# Patient Record
Sex: FEMALE | Race: BLACK OR AFRICAN AMERICAN | NOT HISPANIC OR LATINO | URBAN - METROPOLITAN AREA
[De-identification: names, ages, dates, MRNs, and addresses within clinical notes are randomized per-mention and may not be internally consistent; named-entity substitution may affect disease eponyms.]

---

## 2017-03-22 ENCOUNTER — EMERGENCY (EMERGENCY)
Facility: HOSPITAL | Age: 27
LOS: 1 days | Discharge: HOME | End: 2017-03-22
Attending: EMERGENCY MEDICINE | Admitting: EMERGENCY MEDICINE
Payer: COMMERCIAL

## 2017-03-22 VITALS
DIASTOLIC BLOOD PRESSURE: 56 MMHG | HEART RATE: 65 BPM | SYSTOLIC BLOOD PRESSURE: 101 MMHG | RESPIRATION RATE: 18 BRPM | OXYGEN SATURATION: 98 % | TEMPERATURE: 98 F | WEIGHT: 134.92 LBS
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
## 2017-03-22 DIAGNOSIS — Z88.8 ALLERGY STATUS TO OTHER DRUGS, MEDICAMENTS AND BIOLOGICAL SUBSTANCES STATUS: ICD-10-CM
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
## 2017-03-22 DIAGNOSIS — S80.851A SUPERFICIAL FOREIGN BODY, RIGHT LOWER LEG, INITIAL ENCOUNTER: ICD-10-CM
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
## 2017-03-22 PROCEDURE — 73502 X-RAY EXAM HIP UNI 2-3 VIEWS: CPT | Mod: 26,RT

## 2017-03-22 PROCEDURE — 99284 EMERGENCY DEPT VISIT MOD MDM: CPT | Mod: 25
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
## 2017-03-23 VITALS
DIASTOLIC BLOOD PRESSURE: 65 MMHG | OXYGEN SATURATION: 97 % | HEART RATE: 56 BPM | RESPIRATION RATE: 18 BRPM | SYSTOLIC BLOOD PRESSURE: 103 MMHG | TEMPERATURE: 98 F

## 2017-03-23 PROCEDURE — 99283 EMERGENCY DEPT VISIT LOW MDM: CPT | Mod: 25

## 2017-03-23 PROCEDURE — 73502 X-RAY EXAM HIP UNI 2-3 VIEWS: CPT | Mod: 26,RT

## 2017-03-23 PROCEDURE — 73502 X-RAY EXAM HIP UNI 2-3 VIEWS: CPT

## 2017-03-23 RX ORDER — OXYCODONE HYDROCHLORIDE 5 MG/1
1 TABLET ORAL
Qty: 9 | Refills: 0 | OUTPATIENT
Start: 2017-03-23 | End: 2017-03-26

## 2017-03-23 RX ORDER — ACETAMINOPHEN 500 MG
975 TABLET ORAL ONCE
Qty: 0 | Refills: 0 | Status: DISCONTINUED | OUTPATIENT
Start: 2017-03-23 | End: 2017-03-23

## 2017-03-23 RX ORDER — OXYCODONE HYDROCHLORIDE 5 MG/1
15 TABLET ORAL ONCE
Qty: 0 | Refills: 0 | Status: DISCONTINUED | OUTPATIENT
Start: 2017-03-23 | End: 2017-03-23
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
## 2017-03-28 ENCOUNTER — EMERGENCY (EMERGENCY)
Facility: HOSPITAL | Age: 27
LOS: 1 days | Discharge: HOME | End: 2017-03-28
Attending: EMERGENCY MEDICINE | Admitting: EMERGENCY MEDICINE
Payer: COMMERCIAL

## 2017-03-28 ENCOUNTER — APPOINTMENT (OUTPATIENT)
Dept: SURGERY | Facility: CLINIC | Age: 27
End: 2017-03-28

## 2017-03-28 VITALS
DIASTOLIC BLOOD PRESSURE: 70 MMHG | OXYGEN SATURATION: 100 % | WEIGHT: 139.99 LBS | SYSTOLIC BLOOD PRESSURE: 106 MMHG | TEMPERATURE: 98 F | RESPIRATION RATE: 18 BRPM | HEART RATE: 60 BPM

## 2017-03-28 VITALS
OXYGEN SATURATION: 99 % | HEIGHT: 62.5 IN | SYSTOLIC BLOOD PRESSURE: 120 MMHG | HEART RATE: 67 BPM | WEIGHT: 146 LBS | BODY MASS INDEX: 26.19 KG/M2 | DIASTOLIC BLOOD PRESSURE: 74 MMHG | TEMPERATURE: 97.8 F

## 2017-03-28 VITALS
SYSTOLIC BLOOD PRESSURE: 107 MMHG | HEART RATE: 63 BPM | RESPIRATION RATE: 17 BRPM | OXYGEN SATURATION: 100 % | DIASTOLIC BLOOD PRESSURE: 66 MMHG

## 2017-03-28 DIAGNOSIS — S71.101D UNSPECIFIED OPEN WOUND, RIGHT THIGH, SUBSEQUENT ENCOUNTER: ICD-10-CM

## 2017-03-28 DIAGNOSIS — S71.109S: ICD-10-CM
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
## 2017-03-28 DIAGNOSIS — R11.0 NAUSEA: ICD-10-CM
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
## 2017-03-28 DIAGNOSIS — S31.819D UNSPECIFIED OPEN WOUND OF RIGHT BUTTOCK, SUBSEQUENT ENCOUNTER: ICD-10-CM
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
## 2017-03-28 DIAGNOSIS — Z88.8 ALLERGY STATUS TO OTHER DRUGS, MEDICAMENTS AND BIOLOGICAL SUBSTANCES STATUS: ICD-10-CM

## 2017-03-28 DIAGNOSIS — Z79.899 OTHER LONG TERM (CURRENT) DRUG THERAPY: ICD-10-CM

## 2017-03-28 DIAGNOSIS — Z23 ENCOUNTER FOR IMMUNIZATION: ICD-10-CM
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
## 2017-03-28 PROCEDURE — 90471 IMMUNIZATION ADMIN: CPT

## 2017-03-28 PROCEDURE — 99284 EMERGENCY DEPT VISIT MOD MDM: CPT

## 2017-03-28 PROCEDURE — 99283 EMERGENCY DEPT VISIT LOW MDM: CPT | Mod: 25

## 2017-03-28 PROCEDURE — 90715 TDAP VACCINE 7 YRS/> IM: CPT

## 2017-03-28 PROCEDURE — 73502 X-RAY EXAM HIP UNI 2-3 VIEWS: CPT | Mod: 26,RT

## 2017-03-28 PROCEDURE — 73502 X-RAY EXAM HIP UNI 2-3 VIEWS: CPT

## 2017-03-28 RX ORDER — TETANUS TOXOID, REDUCED DIPHTHERIA TOXOID AND ACELLULAR PERTUSSIS VACCINE, ADSORBED 5; 2.5; 8; 8; 2.5 [IU]/.5ML; [IU]/.5ML; UG/.5ML; UG/.5ML; UG/.5ML
0.5 SUSPENSION INTRAMUSCULAR ONCE
Qty: 0 | Refills: 0 | Status: COMPLETED | OUTPATIENT
Start: 2017-03-28 | End: 2017-03-28

## 2017-03-28 RX ORDER — FAMOTIDINE 10 MG/ML
1 INJECTION INTRAVENOUS
Qty: 15 | Refills: 0 | OUTPATIENT
Start: 2017-03-28 | End: 2017-04-12

## 2017-03-28 RX ORDER — ONDANSETRON 8 MG/1
4 TABLET, FILM COATED ORAL ONCE
Qty: 0 | Refills: 0 | Status: COMPLETED | OUTPATIENT
Start: 2017-03-28 | End: 2017-03-28

## 2017-03-28 RX ORDER — ONDANSETRON 8 MG/1
1 TABLET, FILM COATED ORAL
Qty: 20 | Refills: 0 | OUTPATIENT
Start: 2017-03-28

## 2017-03-28 RX ORDER — ACETAMINOPHEN 500 MG
650 TABLET ORAL ONCE
Qty: 0 | Refills: 0 | Status: COMPLETED | OUTPATIENT
Start: 2017-03-28 | End: 2017-03-28

## 2017-03-28 RX ADMIN — TETANUS TOXOID, REDUCED DIPHTHERIA TOXOID AND ACELLULAR PERTUSSIS VACCINE, ADSORBED 0.5 MILLILITER(S): 5; 2.5; 8; 8; 2.5 SUSPENSION INTRAMUSCULAR at 16:55

## 2017-03-28 RX ADMIN — Medication 650 MILLIGRAM(S): at 16:55

## 2017-03-28 RX ADMIN — ONDANSETRON 4 MILLIGRAM(S): 8 TABLET, FILM COATED ORAL at 17:13
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
## 2017-03-28 NOTE — ED PROVIDER NOTE - PROGRESS NOTE DETAILS
Xray w cont fb, slightly less. Discussed wound care, retained fb, signs/sx of infection w pt and sig other. Pt c/o n, states not sexually active since before birth of son, hcg neg at last visit.  Will dc rec tylenol, trial of h2 blocker and nausea meds.  Rec f/u pmd.
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
## 2017-04-14 NOTE — ED ADULT TRIAGE NOTE - HEART RATE (BEATS/MIN)
65
Alert-The patient is alert, awake and responds to voice. The patient is oriented to time, place, and person. The triage nurse is able to obtain subjective information.

## 2017-12-16 ENCOUNTER — EMERGENCY (EMERGENCY)
Facility: HOSPITAL | Age: 27
LOS: 1 days | Discharge: HOME | End: 2017-12-16
Admitting: EMERGENCY MEDICINE
Payer: COMMERCIAL

## 2017-12-16 VITALS
DIASTOLIC BLOOD PRESSURE: 57 MMHG | RESPIRATION RATE: 18 BRPM | HEART RATE: 73 BPM | TEMPERATURE: 98 F | WEIGHT: 125 LBS | OXYGEN SATURATION: 100 % | SYSTOLIC BLOOD PRESSURE: 100 MMHG

## 2017-12-16 PROCEDURE — 73660 X-RAY EXAM OF TOE(S): CPT | Mod: 26,RT

## 2017-12-16 PROCEDURE — 99283 EMERGENCY DEPT VISIT LOW MDM: CPT | Mod: 25

## 2017-12-16 PROCEDURE — 73660 X-RAY EXAM OF TOE(S): CPT

## 2017-12-16 RX ORDER — ACETAMINOPHEN 500 MG
650 TABLET ORAL ONCE
Qty: 0 | Refills: 0 | Status: COMPLETED | OUTPATIENT
Start: 2017-12-16 | End: 2017-12-16

## 2017-12-16 RX ADMIN — Medication 650 MILLIGRAM(S): at 02:12

## 2017-12-16 NOTE — ED PROVIDER NOTE - OBJECTIVE STATEMENT
28 yo female in the Er with chronic pain to her right toe for a few month. pt mentioned that she had an infection there in the past and not sure if its healed properly. Denies discolorations, numbness or swelling to her toe

## 2017-12-16 NOTE — ED PROVIDER NOTE - MEDICAL DECISION MAKING DETAILS
28 yo female with chronic pain to her right toe. No evidence of fx, no infection on exam, sensations intact. unclear etiology of pain. plan : comfortable shoe, pain control podiatry f/u

## 2017-12-16 NOTE — ED PROVIDER NOTE - MUSCULOSKELETAL, MLM
Spine appears normal, range of motion is not limited,  right toe- no discolorations, nail intact, normal sensations and no deformity, no wounds

## 2017-12-20 DIAGNOSIS — G89.29 OTHER CHRONIC PAIN: ICD-10-CM

## 2017-12-20 DIAGNOSIS — Z88.8 ALLERGY STATUS TO OTHER DRUGS, MEDICAMENTS AND BIOLOGICAL SUBSTANCES STATUS: ICD-10-CM

## 2017-12-20 DIAGNOSIS — M79.674 PAIN IN RIGHT TOE(S): ICD-10-CM

## 2017-12-26 ENCOUNTER — EMERGENCY (EMERGENCY)
Facility: HOSPITAL | Age: 27
LOS: 1 days | Discharge: HOME | End: 2017-12-26
Attending: EMERGENCY MEDICINE | Admitting: EMERGENCY MEDICINE
Payer: COMMERCIAL

## 2017-12-26 VITALS
RESPIRATION RATE: 18 BRPM | WEIGHT: 125 LBS | TEMPERATURE: 99 F | SYSTOLIC BLOOD PRESSURE: 110 MMHG | HEIGHT: 62 IN | HEART RATE: 76 BPM | OXYGEN SATURATION: 100 % | DIASTOLIC BLOOD PRESSURE: 76 MMHG

## 2017-12-26 DIAGNOSIS — R11.2 NAUSEA WITH VOMITING, UNSPECIFIED: ICD-10-CM

## 2017-12-26 DIAGNOSIS — R19.7 DIARRHEA, UNSPECIFIED: ICD-10-CM

## 2017-12-26 DIAGNOSIS — Z88.8 ALLERGY STATUS TO OTHER DRUGS, MEDICAMENTS AND BIOLOGICAL SUBSTANCES STATUS: ICD-10-CM

## 2017-12-26 PROCEDURE — 99284 EMERGENCY DEPT VISIT MOD MDM: CPT

## 2017-12-26 PROCEDURE — 99283 EMERGENCY DEPT VISIT LOW MDM: CPT

## 2017-12-26 RX ORDER — ACETAMINOPHEN 500 MG
650 TABLET ORAL ONCE
Qty: 0 | Refills: 0 | Status: COMPLETED | OUTPATIENT
Start: 2017-12-26 | End: 2017-12-26

## 2017-12-26 RX ADMIN — Medication 650 MILLIGRAM(S): at 15:56

## 2017-12-26 NOTE — ED PROVIDER NOTE - MEDICAL DECISION MAKING DETAILS
26 y/o AA female w/ above PMH presents w/ n/v/d likely 2/2 gastroenteritis. Will tx symptoms w/ tylenol and zofran and d/c. 26 y/o AA female w/ above PMH presents w/ n/v/d likely 2/2 gastroenteritis. Will tx symptoms w/ tylenol and zofran (pt refusing) and d/c. Urine pregnancy negative.
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
## 2017-12-26 NOTE — ED ADULT NURSE NOTE - OBJECTIVE STATEMENT
27 year old female presents to the ED complaining of cramping lower abdominal pain that began 2 days ago associated with nausea, and several episodes of vomiting and diarrhea. Denies fever, chills, hematuria, dysuria, back pain, headache, and any other associated symptoms. Denies sick contacts at home.

## 2017-12-26 NOTE — ED PROVIDER NOTE - ATTENDING CONTRIBUTION TO CARE
pt seen and examined with resident- 26 yo female with N/V/D  x 1 day  no fevers or chills no yvette abd pain- no melena or hematochezia  AFVSS  op moist  Chest BS= CV RRR  Abd  soft NT ND- well appearing benign abdomen  may dc home  Zofran prn  Pedialyte and fluids

## 2018-05-26 ENCOUNTER — EMERGENCY (EMERGENCY)
Facility: HOSPITAL | Age: 28
LOS: 1 days | Discharge: HOME | End: 2018-05-26
Attending: EMERGENCY MEDICINE | Admitting: EMERGENCY MEDICINE
Payer: COMMERCIAL

## 2018-05-26 VITALS
HEIGHT: 62 IN | TEMPERATURE: 98 F | OXYGEN SATURATION: 100 % | HEART RATE: 67 BPM | WEIGHT: 113.1 LBS | SYSTOLIC BLOOD PRESSURE: 117 MMHG | RESPIRATION RATE: 20 BRPM | DIASTOLIC BLOOD PRESSURE: 79 MMHG

## 2018-05-26 DIAGNOSIS — R53.81 OTHER MALAISE: ICD-10-CM

## 2018-05-26 DIAGNOSIS — R51 HEADACHE: ICD-10-CM

## 2018-05-26 DIAGNOSIS — R09.81 NASAL CONGESTION: ICD-10-CM

## 2018-05-26 DIAGNOSIS — Z88.8 ALLERGY STATUS TO OTHER DRUGS, MEDICAMENTS AND BIOLOGICAL SUBSTANCES: ICD-10-CM

## 2018-05-26 DIAGNOSIS — R05 COUGH: ICD-10-CM

## 2018-05-26 PROCEDURE — 99283 EMERGENCY DEPT VISIT LOW MDM: CPT | Mod: 25

## 2018-05-26 PROCEDURE — 99283 EMERGENCY DEPT VISIT LOW MDM: CPT

## 2018-05-26 PROCEDURE — 71046 X-RAY EXAM CHEST 2 VIEWS: CPT | Mod: 26

## 2018-05-26 PROCEDURE — 71046 X-RAY EXAM CHEST 2 VIEWS: CPT

## 2018-05-26 RX ORDER — PSEUDOEPHEDRINE HCL 30 MG
30 TABLET ORAL ONCE
Qty: 0 | Refills: 0 | Status: COMPLETED | OUTPATIENT
Start: 2018-05-26 | End: 2018-05-26

## 2018-05-26 RX ORDER — ACETAMINOPHEN 500 MG
650 TABLET ORAL ONCE
Qty: 0 | Refills: 0 | Status: COMPLETED | OUTPATIENT
Start: 2018-05-26 | End: 2018-05-26

## 2018-05-26 RX ADMIN — Medication 650 MILLIGRAM(S): at 12:42

## 2018-05-26 RX ADMIN — Medication 30 MILLIGRAM(S): at 13:14

## 2018-05-26 NOTE — ED PROVIDER NOTE - MEDICAL DECISION MAKING DETAILS
28 yo female with hx of rickets, IBS, otherwise healthy, p/w body aches, cough productive of brown sputum, congestion, diffuse HA and malaise X 3 days. (+) congestion/ sore throat. Took Tylenol pm 2 days ago for these sxs. Had the flu shot this past season. No V/D/ abd pain/ fevers. Pt non-toxic appearing with non-focal exam. Pt given Tylenol in ED. CXR with NAD. Findings discussed with pt and supportive care recommended. Pt requesting decongestant. Sudafed given. To f/up outpt.

## 2018-05-26 NOTE — ED ADULT NURSE NOTE - OBJECTIVE STATEMENT
c/o body , aches , chills, headache protective cough with brown mucus for 3 days , no sick contact , child is healthy LMP 1 week ago, Tylenol taken 2 days ago

## 2018-05-26 NOTE — ED PROVIDER NOTE - OBJECTIVE STATEMENT
26 yo female with hx of 26 yo female with hx of rickets, IBS, otherwise healthy, p/w body aches, cough productive of brown sputum, diffuse HA and malaise X 3 days. (+) congestion/ sore throat. Took Tylenol pm 2 days ago for these sxs. Had the flu shot this past season. No V/D/ abd pain/ fevers. Pt is requesting IV pain meds. Denies any other complaints. 28 yo female with hx of rickets, IBS, otherwise healthy, p/w body aches, cough productive of brown sputum, congestion, diffuse HA and malaise X 3 days. (+) congestion/ sore throat. Took Tylenol pm 2 days ago for these sxs. Had the flu shot this past season. No V/D/ abd pain/ fevers. Pt is requesting IV pain meds. Denies any other complaints.

## 2018-05-26 NOTE — ED PROVIDER NOTE - PROGRESS NOTE DETAILS
Pt given Tylenol. CXR with NAD. Findings discussed with pt and supportive care recommended. Pt requesting decongestant. Sudafed given.

## 2018-05-26 NOTE — ED PROVIDER NOTE - ENMT, MLM
Airway patent, Nasal mucosa clear. Mouth with normal mucosa. Throat has no vesicles, no oropharyngeal exudates and uvula is midline. TMs clear b/l.

## 2018-12-02 ENCOUNTER — EMERGENCY (EMERGENCY)
Facility: HOSPITAL | Age: 28
LOS: 1 days | Discharge: HOME | End: 2018-12-02
Attending: EMERGENCY MEDICINE | Admitting: EMERGENCY MEDICINE
Payer: COMMERCIAL

## 2018-12-02 VITALS
DIASTOLIC BLOOD PRESSURE: 72 MMHG | SYSTOLIC BLOOD PRESSURE: 104 MMHG | RESPIRATION RATE: 16 BRPM | TEMPERATURE: 98 F | HEART RATE: 74 BPM | OXYGEN SATURATION: 98 %

## 2018-12-02 VITALS
TEMPERATURE: 98 F | RESPIRATION RATE: 19 BRPM | WEIGHT: 119.93 LBS | SYSTOLIC BLOOD PRESSURE: 117 MMHG | DIASTOLIC BLOOD PRESSURE: 78 MMHG | HEART RATE: 77 BPM | OXYGEN SATURATION: 99 %

## 2018-12-02 LAB
ALBUMIN SERPL ELPH-MCNC: 4.8 G/DL — SIGNIFICANT CHANGE UP (ref 3.3–5)
ALP SERPL-CCNC: 44 U/L — SIGNIFICANT CHANGE UP (ref 40–120)
ALT FLD-CCNC: 6 U/L — LOW (ref 10–45)
ANION GAP SERPL CALC-SCNC: 15 MMOL/L — SIGNIFICANT CHANGE UP (ref 5–17)
APPEARANCE UR: CLEAR — SIGNIFICANT CHANGE UP
AST SERPL-CCNC: 11 U/L — SIGNIFICANT CHANGE UP (ref 10–40)
BASOPHILS NFR BLD AUTO: 0.4 % — SIGNIFICANT CHANGE UP (ref 0–2)
BILIRUB SERPL-MCNC: 0.2 MG/DL — SIGNIFICANT CHANGE UP (ref 0.2–1.2)
BILIRUB UR-MCNC: NEGATIVE — SIGNIFICANT CHANGE UP
BUN SERPL-MCNC: 11 MG/DL — SIGNIFICANT CHANGE UP (ref 7–23)
CALCIUM SERPL-MCNC: 9.7 MG/DL — SIGNIFICANT CHANGE UP (ref 8.4–10.5)
CHLORIDE SERPL-SCNC: 95 MMOL/L — LOW (ref 96–108)
CO2 SERPL-SCNC: 25 MMOL/L — SIGNIFICANT CHANGE UP (ref 22–31)
COLOR SPEC: YELLOW — SIGNIFICANT CHANGE UP
CREAT SERPL-MCNC: 0.61 MG/DL — SIGNIFICANT CHANGE UP (ref 0.5–1.3)
DIFF PNL FLD: NEGATIVE — SIGNIFICANT CHANGE UP
EOSINOPHIL NFR BLD AUTO: 1.8 % — SIGNIFICANT CHANGE UP (ref 0–6)
GLUCOSE SERPL-MCNC: 97 MG/DL — SIGNIFICANT CHANGE UP (ref 70–99)
GLUCOSE UR QL: NEGATIVE — SIGNIFICANT CHANGE UP
HCG SERPL-ACNC: <.1 MIU/ML — SIGNIFICANT CHANGE UP
HCT VFR BLD CALC: 36.1 % — SIGNIFICANT CHANGE UP (ref 34.5–45)
HGB BLD-MCNC: 11.8 G/DL — SIGNIFICANT CHANGE UP (ref 11.5–15.5)
KETONES UR-MCNC: NEGATIVE — SIGNIFICANT CHANGE UP
LEUKOCYTE ESTERASE UR-ACNC: NEGATIVE — SIGNIFICANT CHANGE UP
LIDOCAIN IGE QN: 32 U/L — SIGNIFICANT CHANGE UP (ref 7–60)
LYMPHOCYTES # BLD AUTO: 20.2 % — SIGNIFICANT CHANGE UP (ref 13–44)
MCHC RBC-ENTMCNC: 29.6 PG — SIGNIFICANT CHANGE UP (ref 27–34)
MCHC RBC-ENTMCNC: 32.7 G/DL — SIGNIFICANT CHANGE UP (ref 32–36)
MCV RBC AUTO: 90.7 FL — SIGNIFICANT CHANGE UP (ref 80–100)
MONOCYTES NFR BLD AUTO: 8.6 % — SIGNIFICANT CHANGE UP (ref 2–14)
NEUTROPHILS NFR BLD AUTO: 69 % — SIGNIFICANT CHANGE UP (ref 43–77)
NITRITE UR-MCNC: NEGATIVE — SIGNIFICANT CHANGE UP
PH UR: 7 — SIGNIFICANT CHANGE UP (ref 5–8)
PLATELET # BLD AUTO: 217 K/UL — SIGNIFICANT CHANGE UP (ref 150–400)
POTASSIUM SERPL-MCNC: 3.9 MMOL/L — SIGNIFICANT CHANGE UP (ref 3.5–5.3)
POTASSIUM SERPL-SCNC: 3.9 MMOL/L — SIGNIFICANT CHANGE UP (ref 3.5–5.3)
PROT SERPL-MCNC: 7.4 G/DL — SIGNIFICANT CHANGE UP (ref 6–8.3)
PROT UR-MCNC: NEGATIVE MG/DL — SIGNIFICANT CHANGE UP
RBC # BLD: 3.98 M/UL — SIGNIFICANT CHANGE UP (ref 3.8–5.2)
RBC # FLD: 14.1 % — SIGNIFICANT CHANGE UP (ref 10.3–16.9)
SODIUM SERPL-SCNC: 135 MMOL/L — SIGNIFICANT CHANGE UP (ref 135–145)
SP GR SPEC: 1.01 — SIGNIFICANT CHANGE UP (ref 1–1.03)
UROBILINOGEN FLD QL: 0.2 E.U./DL — SIGNIFICANT CHANGE UP
WBC # BLD: 13.7 K/UL — HIGH (ref 3.8–10.5)
WBC # FLD AUTO: 13.7 K/UL — HIGH (ref 3.8–10.5)

## 2018-12-02 PROCEDURE — 99284 EMERGENCY DEPT VISIT MOD MDM: CPT | Mod: 25

## 2018-12-02 PROCEDURE — 84702 CHORIONIC GONADOTROPIN TEST: CPT

## 2018-12-02 PROCEDURE — 74177 CT ABD & PELVIS W/CONTRAST: CPT

## 2018-12-02 PROCEDURE — 80053 COMPREHEN METABOLIC PANEL: CPT

## 2018-12-02 PROCEDURE — 85025 COMPLETE CBC W/AUTO DIFF WBC: CPT

## 2018-12-02 PROCEDURE — 81003 URINALYSIS AUTO W/O SCOPE: CPT

## 2018-12-02 PROCEDURE — 36415 COLL VENOUS BLD VENIPUNCTURE: CPT

## 2018-12-02 PROCEDURE — 83690 ASSAY OF LIPASE: CPT

## 2018-12-02 PROCEDURE — 96361 HYDRATE IV INFUSION ADD-ON: CPT

## 2018-12-02 PROCEDURE — 96374 THER/PROPH/DIAG INJ IV PUSH: CPT | Mod: XU

## 2018-12-02 PROCEDURE — 74177 CT ABD & PELVIS W/CONTRAST: CPT | Mod: 26

## 2018-12-02 RX ORDER — LIDOCAINE 4 G/100G
15 CREAM TOPICAL ONCE
Qty: 0 | Refills: 0 | Status: COMPLETED | OUTPATIENT
Start: 2018-12-02 | End: 2018-12-02

## 2018-12-02 RX ORDER — SODIUM CHLORIDE 9 MG/ML
1000 INJECTION INTRAMUSCULAR; INTRAVENOUS; SUBCUTANEOUS ONCE
Qty: 0 | Refills: 0 | Status: COMPLETED | OUTPATIENT
Start: 2018-12-02 | End: 2018-12-02

## 2018-12-02 RX ORDER — FAMOTIDINE 10 MG/ML
20 INJECTION INTRAVENOUS ONCE
Qty: 0 | Refills: 0 | Status: COMPLETED | OUTPATIENT
Start: 2018-12-02 | End: 2018-12-02

## 2018-12-02 RX ORDER — IOHEXOL 300 MG/ML
30 INJECTION, SOLUTION INTRAVENOUS ONCE
Qty: 0 | Refills: 0 | Status: COMPLETED | OUTPATIENT
Start: 2018-12-02 | End: 2018-12-02

## 2018-12-02 RX ADMIN — SODIUM CHLORIDE 1000 MILLILITER(S): 9 INJECTION INTRAMUSCULAR; INTRAVENOUS; SUBCUTANEOUS at 04:12

## 2018-12-02 RX ADMIN — SODIUM CHLORIDE 1000 MILLILITER(S): 9 INJECTION INTRAMUSCULAR; INTRAVENOUS; SUBCUTANEOUS at 06:26

## 2018-12-02 RX ADMIN — IOHEXOL 30 MILLILITER(S): 300 INJECTION, SOLUTION INTRAVENOUS at 06:31

## 2018-12-02 RX ADMIN — LIDOCAINE 15 MILLILITER(S): 4 CREAM TOPICAL at 04:12

## 2018-12-02 RX ADMIN — FAMOTIDINE 20 MILLIGRAM(S): 10 INJECTION INTRAVENOUS at 04:12

## 2018-12-02 RX ADMIN — Medication 30 MILLILITER(S): at 04:12

## 2018-12-02 NOTE — ED ADULT NURSE NOTE - OBJECTIVE STATEMENT
Patient to the ED with complaint of upper epigastric pain described as burning for the past 5 days associated with nausea vomiting and diarrhea.  Patient reports that her stool is burgundy in color, denies burning or pain on urination, fever, chills has hx of IBS.

## 2018-12-02 NOTE — ED PROVIDER NOTE - MEDICAL DECISION MAKING DETAILS
29 y/o female well-appearing in NAD here with abdominal pain, nausea, vomiting and diarrhea. Given GI meds without improvement. Labs nml. Pt with continuing discomfort with ttp diffused. Pending CT for further. If negative, likely gastroenteritis.

## 2018-12-02 NOTE — ED ADULT TRIAGE NOTE - OTHER COMPLAINTS
CC of abd pain x 3 days not relieved food and says that its like eating raw garlic. (+) nausea, vomiting, diarrhea x 5 days, denies fevers nor chills

## 2018-12-02 NOTE — ED PROVIDER NOTE - ATTENDING CONTRIBUTION TO CARE
5 days of abd pain, diarrhea.  reports central pain, constant.  tolerating po.  no fever.  abd soft but tender mid abdomen.  vitals stable.  labs with leukocytosis.  concern for colitis, atypical appy.  plan ct.  signed out to MAICOL Skinner pending ct

## 2018-12-02 NOTE — ED PROVIDER NOTE - OBJECTIVE STATEMENT
29 y/o female with a PMHx of IBS who is otherwise healthy is present with abdominal pain x5 days. Her pain is diffused intermittent and describes an achy type of discomfort. Her pain is associated with nausea, vomiting and multiple episodes of diarrhea. She denies taking anything for her discomfort. She denies the following: fever, chills, chest pain, sob, cough, urinary symptoms, sick contacts, travel.

## 2018-12-02 NOTE — ED PROVIDER NOTE - PROGRESS NOTE DETAILS
ct results d/w pt. no obstruction. abd non tender, no distention. no guarding no rebound. will d/c home with pepcid, fluids and f/u with pmd. return precautions d/w pt.

## 2018-12-02 NOTE — ED ADULT NURSE REASSESSMENT NOTE - NS ED NURSE REASSESS COMMENT FT1
Pt received from Julio PAYTON. Pt a&ox3, resting comfortably in stretcher with  and child. Pt awaiting CT results. VSS. Will continue to reassess.

## 2018-12-06 DIAGNOSIS — R19.7 DIARRHEA, UNSPECIFIED: ICD-10-CM

## 2018-12-06 DIAGNOSIS — Z88.6 ALLERGY STATUS TO ANALGESIC AGENT: ICD-10-CM

## 2018-12-06 DIAGNOSIS — R10.84 GENERALIZED ABDOMINAL PAIN: ICD-10-CM

## 2018-12-06 DIAGNOSIS — Z88.8 ALLERGY STATUS TO OTHER DRUGS, MEDICAMENTS AND BIOLOGICAL SUBSTANCES: ICD-10-CM

## 2018-12-06 DIAGNOSIS — R11.2 NAUSEA WITH VOMITING, UNSPECIFIED: ICD-10-CM

## 2020-02-07 ENCOUNTER — EMERGENCY (EMERGENCY)
Facility: HOSPITAL | Age: 30
LOS: 1 days | Discharge: HOME | End: 2020-02-07
Attending: EMERGENCY MEDICINE | Admitting: EMERGENCY MEDICINE
Payer: COMMERCIAL

## 2020-02-07 VITALS
HEIGHT: 63 IN | DIASTOLIC BLOOD PRESSURE: 55 MMHG | HEART RATE: 97 BPM | OXYGEN SATURATION: 97 % | WEIGHT: 125 LBS | TEMPERATURE: 98 F | RESPIRATION RATE: 18 BRPM | SYSTOLIC BLOOD PRESSURE: 99 MMHG

## 2020-02-07 VITALS
HEART RATE: 68 BPM | TEMPERATURE: 98 F | RESPIRATION RATE: 16 BRPM | SYSTOLIC BLOOD PRESSURE: 105 MMHG | OXYGEN SATURATION: 99 % | DIASTOLIC BLOOD PRESSURE: 69 MMHG

## 2020-02-07 DIAGNOSIS — R50.9 FEVER, UNSPECIFIED: ICD-10-CM

## 2020-02-07 DIAGNOSIS — R07.0 PAIN IN THROAT: ICD-10-CM

## 2020-02-07 PROCEDURE — 70360 X-RAY EXAM OF NECK: CPT

## 2020-02-07 PROCEDURE — 70360 X-RAY EXAM OF NECK: CPT | Mod: 26

## 2020-02-07 PROCEDURE — 99283 EMERGENCY DEPT VISIT LOW MDM: CPT

## 2020-02-07 PROCEDURE — 99284 EMERGENCY DEPT VISIT MOD MDM: CPT

## 2020-02-07 RX ORDER — ACETAMINOPHEN 500 MG
1000 TABLET ORAL ONCE
Refills: 0 | Status: COMPLETED | OUTPATIENT
Start: 2020-02-07 | End: 2020-02-07

## 2020-02-07 RX ORDER — DEXAMETHASONE 0.5 MG/5ML
10 ELIXIR ORAL ONCE
Refills: 0 | Status: COMPLETED | OUTPATIENT
Start: 2020-02-07 | End: 2020-02-07

## 2020-02-07 RX ADMIN — Medication 10 MILLIGRAM(S): at 11:08

## 2020-02-07 RX ADMIN — Medication 1000 MILLIGRAM(S): at 11:08

## 2020-02-07 NOTE — ED PROVIDER NOTE - PATIENT PORTAL LINK FT
You can access the FollowMyHealth Patient Portal offered by [facility] by registering at the following website: [email or web address]. By joining [employer]’s FollowMyHealth portal, you will also be able to view your health information using other applications (apps) compatible with our system.

## 2020-02-07 NOTE — ED PROVIDER NOTE - OBJECTIVE STATEMENT
28 y/o F with PMHx of fibromyalgia presents to the ED c/o throat pain x1d w/ subjective fever after waking up today with a cold sweat. States her therapist came back from [place] and was sick. Unknown if it was a viral concern for her.

## 2020-02-07 NOTE — ED ADULT NURSE NOTE - SUICIDE SCREENING QUESTION 2
Addended by: JUAN JOSÉ VERGARA on: 11/19/2018 01:16 PM     Modules accepted: Orders, Level of Service    
No

## 2020-02-07 NOTE — ED ADULT NURSE NOTE - OBJECTIVE STATEMENT
Patient is a 30yo female reporting throat pain, worse with swallowing, since yesterday. Denies chest pain, shortness of breath, abdominal pain, n/v/d, cough, recent travel. Speaking clearly in full sentences.

## 2020-02-07 NOTE — ED PROVIDER NOTE - NSFOLLOWUPINSTRUCTIONS_ED_ALL_ED_FT
Sore Throat  A sore throat is pain, burning, irritation, or scratchiness in the throat. When you have a sore throat, you may feel pain or tenderness in your throat when you swallow or talk.  Many things can cause a sore throat, including:  An infection.Seasonal allergies.Dryness in the air.Irritants, such as smoke or pollution.Radiation treatment to the area.Gastroesophageal reflux disease (GERD).A tumor.A sore throat is often the first sign of another sickness. It may happen with other symptoms, such as coughing, sneezing, fever, and swollen neck glands. Most sore throats go away without medical treatment.  Follow these instructions at home:            Take over-the-counter medicines only as told by your health care provider.   If your child has a sore throat, do not give your child aspirin because of the association with Reye syndrome.Drink enough fluids to keep your urine pale yellow.Rest as needed.To help with pain, try:  Sipping warm liquids, such as broth, herbal tea, or warm water.Eating or drinking cold or frozen liquids, such as frozen ice pops.Gargling with a salt-water mixture 3–4 times a day or as needed. To make a salt-water mixture, completely dissolve ½–1 tsp (3–6 g) of salt in 1 cup (237 mL) of warm water.Sucking on hard candy or throat lozenges.Putting a cool-mist humidifier in your bedroom at night to moisten the air.Sitting in the bathroom with the door closed for 5–10 minutes while you run hot water in the shower.Do not use any products that contain nicotine or tobacco, such as cigarettes, e-cigarettes, and chewing tobacco. If you need help quitting, ask your health care provider.Wash your hands well and often with soap and water. If soap and water are not available, use hand .Contact a health care provider if:  You have a fever for more than 2–3 days.You have symptoms that last (are persistent) for more than 2–3 days.Your throat does not get better within 7 days.You have a fever and your symptoms suddenly get worse.Your child who is 3 months to 3 years old has a temperature of 102.2°F (39°C) or higher.Get help right away if:  You have difficulty breathing.You cannot swallow fluids, soft foods, or your saliva.You have increased swelling in your throat or neck.You have persistent nausea and vomiting.Summary  A sore throat is pain, burning, irritation, or scratchiness in the throat. Many things can cause a sore throat.Take over-the-counter medicines only as told by your health care provider. Do not give your child aspirin.Drink plenty of fluids, and rest as needed.Contact a health care provider if your symptoms worsen or your sore throat does not get better within 7 days.This information is not intended to replace advice given to you by your health care provider. Make sure you discuss any questions you have with your health care provider.    Document Released: 01/25/2006 Document Revised: 05/20/2019 Document Reviewed: 05/20/2019  Elsevier Interactive Patient Education © 2019 [employer]vier Inc.

## 2020-02-07 NOTE — ED PROVIDER NOTE - CLINICAL SUMMARY MEDICAL DECISION MAKING FREE TEXT BOX
lilibeth po normally- throat pain- did not start while eating- no concern for FB- controlling her secretions  no f/c- xray neg for FB- d/c home, acetaminophen, topical honey/tea for pain

## 2020-02-07 NOTE — ED ADULT TRIAGE NOTE - CHIEF COMPLAINT QUOTE
pt states, "I feel like my throat is closing." pt speaking in full sentences, not drooling, uvula midline. pt reports throat is very sore.

## 2020-02-09 ENCOUNTER — EMERGENCY (EMERGENCY)
Facility: HOSPITAL | Age: 30
LOS: 1 days | Discharge: HOME | End: 2020-02-09
Attending: EMERGENCY MEDICINE | Admitting: EMERGENCY MEDICINE
Payer: COMMERCIAL

## 2020-02-09 VITALS
TEMPERATURE: 98 F | DIASTOLIC BLOOD PRESSURE: 50 MMHG | HEART RATE: 68 BPM | RESPIRATION RATE: 16 BRPM | HEIGHT: 63 IN | OXYGEN SATURATION: 100 % | SYSTOLIC BLOOD PRESSURE: 106 MMHG

## 2020-02-09 VITALS
HEART RATE: 71 BPM | DIASTOLIC BLOOD PRESSURE: 63 MMHG | SYSTOLIC BLOOD PRESSURE: 104 MMHG | TEMPERATURE: 98 F | OXYGEN SATURATION: 98 % | RESPIRATION RATE: 17 BRPM

## 2020-02-09 DIAGNOSIS — R07.89 OTHER CHEST PAIN: ICD-10-CM

## 2020-02-09 PROBLEM — M79.7 FIBROMYALGIA: Chronic | Status: ACTIVE | Noted: 2020-02-07

## 2020-02-09 LAB
BASOPHILS # BLD AUTO: 0.06 K/UL — SIGNIFICANT CHANGE UP (ref 0–0.2)
BASOPHILS NFR BLD AUTO: 0.7 % — SIGNIFICANT CHANGE UP (ref 0–2)
EOSINOPHIL # BLD AUTO: 0.16 K/UL — SIGNIFICANT CHANGE UP (ref 0–0.5)
EOSINOPHIL NFR BLD AUTO: 1.8 % — SIGNIFICANT CHANGE UP (ref 0–6)
HCT VFR BLD CALC: 35.7 % — SIGNIFICANT CHANGE UP (ref 34.5–45)
HGB BLD-MCNC: 11.3 G/DL — LOW (ref 11.5–15.5)
IMM GRANULOCYTES NFR BLD AUTO: 0.3 % — SIGNIFICANT CHANGE UP (ref 0–1.5)
LYMPHOCYTES # BLD AUTO: 2.45 K/UL — SIGNIFICANT CHANGE UP (ref 1–3.3)
LYMPHOCYTES # BLD AUTO: 27.3 % — SIGNIFICANT CHANGE UP (ref 13–44)
MCHC RBC-ENTMCNC: 30.6 PG — SIGNIFICANT CHANGE UP (ref 27–34)
MCHC RBC-ENTMCNC: 31.7 GM/DL — LOW (ref 32–36)
MCV RBC AUTO: 96.7 FL — SIGNIFICANT CHANGE UP (ref 80–100)
MONOCYTES # BLD AUTO: 0.56 K/UL — SIGNIFICANT CHANGE UP (ref 0–0.9)
MONOCYTES NFR BLD AUTO: 6.2 % — SIGNIFICANT CHANGE UP (ref 2–14)
NEUTROPHILS # BLD AUTO: 5.72 K/UL — SIGNIFICANT CHANGE UP (ref 1.8–7.4)
NEUTROPHILS NFR BLD AUTO: 63.7 % — SIGNIFICANT CHANGE UP (ref 43–77)
NRBC # BLD: 0 /100 WBCS — SIGNIFICANT CHANGE UP (ref 0–0)
PLATELET # BLD AUTO: 234 K/UL — SIGNIFICANT CHANGE UP (ref 150–400)
RBC # BLD: 3.69 M/UL — LOW (ref 3.8–5.2)
RBC # FLD: 13.4 % — SIGNIFICANT CHANGE UP (ref 10.3–14.5)
WBC # BLD: 8.98 K/UL — SIGNIFICANT CHANGE UP (ref 3.8–10.5)
WBC # FLD AUTO: 8.98 K/UL — SIGNIFICANT CHANGE UP (ref 3.8–10.5)

## 2020-02-09 PROCEDURE — 99284 EMERGENCY DEPT VISIT MOD MDM: CPT | Mod: 25

## 2020-02-09 PROCEDURE — 70490 CT SOFT TISSUE NECK W/O DYE: CPT | Mod: 26

## 2020-02-09 PROCEDURE — 31575 DIAGNOSTIC LARYNGOSCOPY: CPT

## 2020-02-09 PROCEDURE — 85025 COMPLETE CBC W/AUTO DIFF WBC: CPT

## 2020-02-09 PROCEDURE — 70490 CT SOFT TISSUE NECK W/O DYE: CPT

## 2020-02-09 PROCEDURE — 99285 EMERGENCY DEPT VISIT HI MDM: CPT

## 2020-02-09 PROCEDURE — 36415 COLL VENOUS BLD VENIPUNCTURE: CPT

## 2020-02-09 RX ORDER — LIDOCAINE 4 G/100G
10 CREAM TOPICAL ONCE
Refills: 0 | Status: COMPLETED | OUTPATIENT
Start: 2020-02-09 | End: 2020-02-09

## 2020-02-09 RX ORDER — DEXAMETHASONE 0.5 MG/5ML
10 ELIXIR ORAL ONCE
Refills: 0 | Status: COMPLETED | OUTPATIENT
Start: 2020-02-09 | End: 2020-02-09

## 2020-02-09 RX ORDER — DEXAMETHASONE 0.5 MG/5ML
10 ELIXIR ORAL ONCE
Refills: 0 | Status: DISCONTINUED | OUTPATIENT
Start: 2020-02-09 | End: 2020-02-09

## 2020-02-09 RX ADMIN — Medication 10 MILLIGRAM(S): at 19:25

## 2020-02-09 RX ADMIN — LIDOCAINE 10 MILLILITER(S): 4 CREAM TOPICAL at 15:49

## 2020-02-09 NOTE — CONSULT NOTE ADULT - ASSESSMENT
29F presents to ED with 2 days FB sensation. Laryngoscopy exam significant for ***            [facility] at [phone] with any questions/concerns.

## 2020-02-09 NOTE — ED PROVIDER NOTE - PHYSICAL EXAMINATION
Detail Level: Detailed Quality 431: Preventive Care And Screening: Unhealthy Alcohol Use - Screening: Patient screened for unhealthy alcohol use using a single question and scores less than 2 times per year Quality 226: Preventive Care And Screening: Tobacco Use: Screening And Cessation Intervention: Patient screened for tobacco use and is an ex/non-smoker Quality 130: Documentation Of Current Medications In The Medical Record: Current Medications Documented CONSTITUTIONAL: Well-appearing; well-nourished; in no apparent distress.   HEAD: Normocephalic; atraumatic.   EYES: PERRL; EOM intact; conjunctiva and sclera clear  ENT: Posterior oral pharynx clear with no uvular deviation or erythema. No cervical lymphadenopathy.   NECK: Supple; non-tender; no LAD  CARDIOVASCULAR: Normal S1, S2; no murmurs, rubs, or gallops. Regular rate and rhythm.   RESPIRATORY: Breathing easily; breath sounds clear and equal bilaterally; no wheezes, rhonchi, or rales.  GI: Soft; non-distended; non-tender; no palpable organomegaly.   MSK: FROM at all extremities, normal tone   EXT: No cyanosis or edema; N/V intact  SKIN: Normal for age and race; warm; dry; good turgor; no apparent lesions or rash.   NEURO: A & O x 3; face symmetric; grossly unremarkable.   PSYCHOLOGICAL: The patient’s mood and manner are appropriate.

## 2020-02-09 NOTE — ED PROVIDER NOTE - OBJECTIVE STATEMENT
30 y/o F with no PMHx presents to the ED secondary to concern for foreign body sensation to throat for several days. In ED 2 days ago where an X-Ray was taken and no foreign body was seen. Patient with increasing pain with pO intake, burning sensation in throat. Able to tolerate pO. Denies vomiting, fever, chills, chest pain, SOB, or any additional acute complaints or concerns.

## 2020-02-09 NOTE — ED PROVIDER NOTE - PATIENT PORTAL LINK FT
You can access the FollowMyHealth Patient Portal offered by [facility] by registering at the following website: [email or web address]. By joining [employer]’s FollowMyHealth portal, you will also be able to view your health information using other applications (apps) compatible with our system.

## 2020-02-09 NOTE — ED PROVIDER NOTE - CARE PROVIDERS DIRECT ADDRESSES
,DirectAddress_Unknown ,DirectAddress_Unknown,[email or web address]

## 2020-02-09 NOTE — ED PROVIDER NOTE - PROVIDER TOKENS
FREE:[LAST:[jairo],FIRST:[syeda],PHONE:[phone]],FAX:[(   )    -]] FREE:[LAST:[jairo],FIRST:[syeda],PHONE:[phone]],FAX:[(   )    -]],PROVIDER:[TOKEN:[[id number]]]

## 2020-02-09 NOTE — ED PROVIDER NOTE - NSFOLLOWUPINSTRUCTIONS_ED_ALL_ED_FT
Please follow up with your primary physician in 1-2 days for re evaluation.  Please return to ER immediately should your symptoms worsen or if you have any concern prior to this recommended follow up.    Sore Throat  When you have a sore throat, your throat may feel:  Tender.Burning.Irritated.Scratchy.Painful when you swallow.Painful when you talk.Many things can cause a sore throat, such as:  An infection.Allergies.Dry air.Smoke or pollution.Radiation treatment.Gastroesophageal reflux disease (GERD).A tumor.A sore throat can be the first sign of another sickness. It can happen with other problems, like:  Coughing.Sneezing.Fever.Swelling in the neck.Most sore throats go away without treatment.  Follow these instructions at home:            Take over-the-counter medicines only as told by your doctor.   If your child has a sore throat, do not give your child aspirin.Drink enough fluids to keep your pee (urine) pale yellow.Rest when you feel you need to.To help with pain:  Sip warm liquids, such as broth, herbal tea, or warm water.Eat or drink cold or frozen liquids, such as frozen ice pops.Gargle with a salt-water mixture 3–4 times a day or as needed. To make a salt-water mixture, add ½–1 tsp (3–6 g) of salt to 1 cup (237 mL) of warm water. Mix it until you cannot see the salt anymore.Suck on hard candy or throat lozenges.Put a cool-mist humidifier in your bedroom at night.Sit in the bathroom with the door closed for 5–10 minutes while you run hot water in the shower.Do not use any products that contain nicotine or tobacco, such as cigarettes, e-cigarettes, and chewing tobacco. If you need help quitting, ask your doctor.Wash your hands well and often with soap and water. If soap and water are not available, use hand .Contact a doctor if:  You have a fever for more than 2–3 days.You keep having symptoms for more than 2–3 days.Your throat does not get better in 7 days.You have a fever and your symptoms suddenly get worse.Your child who is 3 months to 3 years old has a temperature of 102.2°F (39°C) or higher.Get help right away if:  You have trouble breathing.You cannot swallow fluids, soft foods, or your saliva.You have swelling in your throat or neck that gets worse.You keep feeling sick to your stomach (nauseous).You keep throwing up (vomiting).Summary  A sore throat is pain, burning, irritation, or scratchiness in the throat. Many things can cause a sore throat.Take over-the-counter medicines only as told by your doctor. Do not give your child aspirin.Drink plenty of fluids, and rest as needed.Contact a doctor if your symptoms get worse or your sore throat does not get better within 7 days.This information is not intended to replace advice given to you by your health care provider. Make sure you discuss any questions you have with your health care provider.    Document Released: 09/26/2009 Document Revised: 05/20/2019 Document Reviewed: 05/20/2019  [employer] Interactive Patient Education © 2019 Elsevier Inc.

## 2020-02-09 NOTE — ED PROVIDER NOTE - CARE PROVIDER_API CALL
syeda gill  Phone: [phone]  Fax: (   )    -  Follow Up Time: syeda gill  Phone: [phone]  Fax: (   )    -  Follow Up Time:     Marina Mcmullen)  Otolaryngology  [address] [place] [address], [address] [place] [address]  Phone: [phone]  Fax: [phone]  Follow Up Time:

## 2020-02-09 NOTE — ED ADULT NURSE NOTE - OBJECTIVE STATEMENT
Pt. c/o throat pain, dry cough, chills, and body aches x 3 days. Seen at [facility] ED 2/7 and given steroid shot which helped but pt. states pain is returning. Pt. reports pain is "sharp" "I can feel it moving", denies difficulty breathing but c/o painful swallowing. Speaking in clear complete sentences on RA. Denies fever.

## 2020-02-09 NOTE — CONSULT NOTE ADULT - SUBJECTIVE AND OBJECTIVE BOX
HPI: 29F with no significant PMH who presents for foreign body sensation x2 days after eating fish. Pt presented to [facility] ED 2 days ago for same complaint and XR was obtained which showed no radioopaque foreign body but demonstrated edema of epiglottis. Pt was given oral dose of steroids and discharged. Pt then returned to ED today with same complaint and CT neck was obtained which did not show foreign body but again showed edema of epiglottis. Today patient is complaining of odynophagia and decreasing ability to tolerate PO. She feels that pain is getting worse and feels "throat closing" but denies any SOB, choking, aspiration, drooling. She denies fevers but endorses chills in the evening. She is fully immunized and denies recent travel or sick contacts.     Allergies    Demerol HCl (Flushing)  ibuprofen (Unknown)  penicillin (Unknown)    Intolerances        PAST MEDICAL & SURGICAL HISTORY:  Fibromyalgia  Rickets  IBS (irritable bowel syndrome)  No significant past surgical history      SOCIAL HISTORY: smokes marijuana         REVIEW OF SYSTEMS: as per HPI, other systems negative         Vital Signs Last 24 Hrs  T(C): 36.7 (09 Feb 2020 14:01), Max: 36.7 (09 Feb 2020 14:01)  T(F): 98 (09 Feb 2020 14:01), Max: 98 (09 Feb 2020 14:01)  HR: 68 (09 Feb 2020 14:01) (68 - 68)  BP: 106/50 (09 Feb 2020 14:01) (106/50 - 106/50)  BP(mean): --  RR: 16 (09 Feb 2020 14:01) (16 - 16)  SpO2: 100% (09 Feb 2020 14:01) (100% - 100%)    LABS:  CBC-           PHYSICAL EXAM:    ENT EXAM-   Constitutional: Well-developed, well-nourished.  No hoarseness.     Head:  normocephalic, atraumatic.   Nose:  Septum intact, midline, deviated.  Inferior turbinates normal bilateral  OC/OP:  Floor of mouth, buccal mucosa, lips, hard palate, soft palate, uvula, posterior pharyngeal wall normal.  Mucosa moist.  Neck:  Trachea midline.  Thyroid, parotid and submandibular glands normal.  Lymph:  No cervical adenopathy.      MULTISYSTEM EXAM-  Neuro/Psych:  A&O x 3  Pulm:  No dyspnea, non-labored breathing  Skin:  No rash or lesions on exposed skin of head/neck      Laryngoscopy Findings:   LARYNGOSCOPY EXAM:     -Verbal consent was obtained from patient prior to procedure.    Indication:    Anesthesia: Afrin spray was applied to the nasal cavities.    Flexible laryngoscopy was performed and revealed the following:    -- Nasopharynx had no mass or exudate.    -- Base of tongue was symmetric and not enlarged.    -- Vallecula was clear    -- Epiglottis, both aryepiglottic folds and both false vocal folds were normal    -- Arytenoids both without edema and erythema     -- True vocal folds were fully mobile and without lesions.     -- Post cricoid area was clear.    -- Interarytenoid edema was absent    The patient tolerated the procedure well.        RADIOLOGY & ADDITIONAL STUDIES:  EXAM:  CT NECK SOFT TISSUE                          PROCEDURE DATE:  02/09/2020          INTERPRETATION:  CLINICAL HISTORY/REASON FOR EXAM: Throat pain, possible fishbone    TECHNIQUE:  CT neck with contrast. Multiple CT axial images of the neck obtained without IV contrast. Coronal sagittal reformatted images were obtained.      COMPARISON: Neck radiograph 2/7/2020    FINDINGS:    The visualized brain parenchyma is unremarkable.    The paranasal sinuses are well-aerated.  Mastoid air cells are well aerated.     Globes and orbits are unremarkable.    The parotid and submandibular glands are unremarkable.      Again demonstrated is thickening of the epiglottis. The airway is patent. There is no radiopaque foreign body.    There is no cervical adenopathy.    The visualized lung apices are unremarkable.    The visualized osseous structures are unremarkable.    IMPRESSION:     No evidence of radiopaque foreign body.    Again demonstrated is thickening of the epiglottis as previously seen on prior neck radiograph 2/7/2020. Clinical correlation is recommended.    Findings were discussed with MAICOL Valdes by Dr. Juan Martinez on 2/9/2020 3:23 PM  EXAM:  XR NECK-SOFT TISSUE                          PROCEDURE DATE:  02/07/2020          INTERPRETATION:  CLINICAL INFORMATION: Throat pain    Exam: AP and lateral radiographs of the neck    Comparison: None    Findings:    There is no retropharyngeal/prevertebral soft tissue swelling. The epiglottis is thickened which may mildly narrow the airway. No radiopaque foreign body or gas collection. There is straightening of cervical lordosis but otherwise unremarkable radiographic appearance of the cervical spine.    IMPRESSION:    The epiglottis appears thickened; query epiglottitis. Airway assessment and further clinical correlation advised.    No retropharyngeal swelling. No radiopaque foreign body.      Case discussed with Dr. Sadler of the ED at 11:57 AM on 02/07/2020. HPI: 29F with no significant PMH who presents for foreign body sensation x2 days after eating fish. Pt presented to [facility] ED 2 days ago for same complaint and XR was obtained which showed no radioopaque foreign body but demonstrated edema of epiglottis. Pt was given oral dose of steroids and discharged. Pt then returned to ED today with same complaint and CT neck was obtained which did not show foreign body but again showed edema of epiglottis. Today patient is complaining of odynophagia and persistent FB sensation. Denies any SOB, choking, aspiration, drooling. She denies fevers but endorses chills in the evening. She is fully immunized and denies recent travel or sick contacts. WBC 8.9. Endorses marijuana use 3x daily.     Allergies    Demerol HCl (Flushing)  ibuprofen (Unknown)  penicillin (Unknown)    Intolerances        PAST MEDICAL & SURGICAL HISTORY:  Fibromyalgia  Rickets  IBS (irritable bowel syndrome)  No significant past surgical history      SOCIAL HISTORY: smokes marijuana         REVIEW OF SYSTEMS: as per HPI, other systems negative         Vital Signs Last 24 Hrs  T(C): 36.7 (09 Feb 2020 14:01), Max: 36.7 (09 Feb 2020 14:01)  T(F): 98 (09 Feb 2020 14:01), Max: 98 (09 Feb 2020 14:01)  HR: 68 (09 Feb 2020 14:01) (68 - 68)  BP: 106/50 (09 Feb 2020 14:01) (106/50 - 106/50)  BP(mean): --  RR: 16 (09 Feb 2020 14:01) (16 - 16)  SpO2: 100% (09 Feb 2020 14:01) (100% - 100%)    LABS:  CBC-           PHYSICAL EXAM:  NAD, alert / oriented  Nonlabored breathing on RA, strong voice  OC/OP:  Floor of mouth, buccal mucosa, lips, hard palate, soft palate, uvula, posterior pharyngeal wall normal.  Mucosa moist.      MULTISYSTEM EXAM-  Neuro/Psych:  A&O x 3  Pulm:  No dyspnea, non-labored breathing  Skin:  No rash or lesions on exposed skin of head/neck      Laryngoscopy Findings:   LARYNGOSCOPY EXAM:     -Verbal consent was obtained from patient prior to procedure.    Indication:    Anesthesia: Afrin spray was applied to the nasal cavities.    Flexible laryngoscopy was performed and revealed the following:    -- Nasopharynx had no mass or exudate.    -- Base of tongue was symmetric and not enlarged.    -- Vallecula was clear    -- Epiglottis with mild, watery, non-obstructive edema of superior epiglottis , both aryepiglottic folds and both false vocal folds were normal    -- Arytenoids both without edema and erythema     -- True vocal folds were fully mobile and without lesions.     -- Post cricoid area was clear.    -- Interarytenoid edema was absent    The patient tolerated the procedure well.        RADIOLOGY & ADDITIONAL STUDIES:  EXAM:  CT NECK SOFT TISSUE                          PROCEDURE DATE:  02/09/2020          INTERPRETATION:  CLINICAL HISTORY/REASON FOR EXAM: Throat pain, possible fishbone    TECHNIQUE:  CT neck with contrast. Multiple CT axial images of the neck obtained without IV contrast. Coronal sagittal reformatted images were obtained.      COMPARISON: Neck radiograph 2/7/2020    FINDINGS:    The visualized brain parenchyma is unremarkable.    The paranasal sinuses are well-aerated.  Mastoid air cells are well aerated.     Globes and orbits are unremarkable.    The parotid and submandibular glands are unremarkable.      Again demonstrated is thickening of the epiglottis. The airway is patent. There is no radiopaque foreign body.    There is no cervical adenopathy.    The visualized lung apices are unremarkable.    The visualized osseous structures are unremarkable.    IMPRESSION:     No evidence of radiopaque foreign body.    Again demonstrated is thickening of the epiglottis as previously seen on prior neck radiograph 2/7/2020. Clinical correlation is recommended.    Findings were discussed with MAICOL Valdes by Dr. Juan Martinez on 2/9/2020 3:23 PM  EXAM:  XR NECK-SOFT TISSUE                          PROCEDURE DATE:  02/07/2020          INTERPRETATION:  CLINICAL INFORMATION: Throat pain    Exam: AP and lateral radiographs of the neck    Comparison: None    Findings:    There is no retropharyngeal/prevertebral soft tissue swelling. The epiglottis is thickened which may mildly narrow the airway. No radiopaque foreign body or gas collection. There is straightening of cervical lordosis but otherwise unremarkable radiographic appearance of the cervical spine.    IMPRESSION:    The epiglottis appears thickened; query epiglottitis. Airway assessment and further clinical correlation advised.    No retropharyngeal swelling. No radiopaque foreign body.      Case discussed with Dr. Sadler of the ED at 11:57 AM on 02/07/2020.      A/P: 29F presents to ED with 2 days FB sensation. Laryngoscopy exam significant for mild, watery, non-obstructive edema of superior edge of epiglottis   - dose of dexamethasone in ED  - medrol dose pack for d/c   - follow up w/ Dr. Murrieta next week ()       [facility] at [phone] with any questions/concerns.

## 2020-02-09 NOTE — ED PROVIDER NOTE - CLINICAL SUMMARY MEDICAL DECISION MAKING FREE TEXT BOX
28 y/o F in ED secondary to concern for foreign body sensation in throat. In ED several days ago for similar concern, unremarkable X-Ray imaging at that time, no evidence of foreign body but symptoms continued. Posterior pharynx appears clear, no respiratory distress on exam. Will obtain CT soft tissue to evaluate for foreign body and disposition accordingly. 30 y/o F in ED secondary to concern for foreign body sensation in throat. In ED several days ago for similar concern, unremarkable X-Ray imaging at that time, no evidence of foreign body but symptoms continued. Posterior pharynx appears clear, no respiratory distress on exam. Will obtain CT soft tissue to evaluate for foreign body and disposition accordingly.    Update @ 1906 - Patient remains stable throughout her stay in  ED.  CT reviewed and epiglottis findings noted.  ENT is consulted and agreeable to scope patient in ED.  Following ENT scope, recommendation for 10 mg decadron and discharge with several day course of oral steroid with Dr. Murrieta follow up.  Plan is discussed with patient.  Patient is advised to follow up with Dr. Murrieta (info provided) in 1-2 days without fail for re eval.  Patient instructed to return to ED immediately should their symptoms worsen or if there is any concern prior to the recommended PCP follow up.  Patient is aware of plan and verbalizes their understanding.  Will discharge home at this time. 30 y/o F in ED secondary to concern for foreign body sensation in throat. In ED several days ago for similar concern, unremarkable X-Ray imaging at that time, no evidence of foreign body but symptoms continued. Posterior pharynx appears clear, no respiratory distress on exam. Will obtain CT soft tissue to evaluate for foreign body and disposition accordingly.    Update @ 1906 - Patient remains stable throughout her stay in  ED.  CT reviewed and epiglottis findings noted.  ENT is consulted and agreeable to scope patient in ED.  Following ENT scope, recommendation for 10 mg decadron and discharge with several day course of oral steroid with Dr. Murrieta follow up.  Plan is discussed with patient.  Patient is advised to follow up with Dr. Murrieta (info provided) in 1-2 days without fail for re eval.  Patient adamantly requesting 2nd name/number in case Dr. Murrieta does not take her insurance - Dr. Mcmullen also provided, however patient encouraged to call Dr. Murrieta first, given he is consulted on her ED care.  Patient instructed to return to ED immediately should their symptoms worsen or if there is any concern prior to the recommended PCP follow up.  Patient is aware of plan and verbalizes their understanding.  Will discharge home at this time.

## 2020-02-11 ENCOUNTER — APPOINTMENT (OUTPATIENT)
Dept: OTOLARYNGOLOGY | Facility: CLINIC | Age: 30
End: 2020-02-11
Payer: MEDICAID

## 2020-02-11 VITALS
HEIGHT: 62 IN | WEIGHT: 146 LBS | BODY MASS INDEX: 26.87 KG/M2 | OXYGEN SATURATION: 100 % | SYSTOLIC BLOOD PRESSURE: 103 MMHG | HEART RATE: 67 BPM | TEMPERATURE: 97.9 F | DIASTOLIC BLOOD PRESSURE: 69 MMHG

## 2020-02-11 PROCEDURE — 99204 OFFICE O/P NEW MOD 45 MIN: CPT | Mod: 25

## 2020-02-11 PROCEDURE — 31575 DIAGNOSTIC LARYNGOSCOPY: CPT

## 2020-02-11 RX ORDER — PREDNISONE 20 MG/1
20 TABLET ORAL
Refills: 0 | Status: ACTIVE | COMMUNITY

## 2020-02-11 RX ORDER — FAMOTIDINE 20 MG/1
20 TABLET, FILM COATED ORAL
Qty: 60 | Refills: 1 | Status: ACTIVE | COMMUNITY
Start: 2020-02-11 | End: 1900-01-01

## 2020-02-11 RX ORDER — CLARITHROMYCIN 500 MG/1
500 TABLET, FILM COATED ORAL TWICE DAILY
Qty: 20 | Refills: 0 | Status: ACTIVE | COMMUNITY
Start: 2020-02-11 | End: 1900-01-01

## 2020-02-11 NOTE — ASSESSMENT
[FreeTextEntry1] : Discussed supportive care for now & the need to urgently go to the ER for any worsening of her sxs or dyspnea. RTC 48 hrs for recheck. Asked her to go ahead and start her prednisone & discussed proper usage.

## 2020-02-11 NOTE — PHYSICAL EXAM
[de-identified] : mildly tender thyroid [Laryngoscopy Performed] : laryngoscopy was performed, see procedure section for findings [Normal] : no rashes

## 2020-02-11 NOTE — PROCEDURE
[de-identified] : Indication: requirement for exam not possible via anterior examination; throat pain\par After verbal consent and the administration of an aerosolized phenylephrine/lidocaine mix, examination was performed with a flexible endoscope placed through the nose. Findings:\par Nasopharynx: unremarkable\par Soft palate, lateral and posterior pharyngeal walls: unremarkable\par Base of tongue & lingual tonsil: minimal retrodisplacement\par Vallecula: unremarkable\par Epiglottis: globally mildly edematous epiglottis; no FB or purulence seen\par Piriform sinuses and pharyngoesophageal junction: unremarkable\par Arytenoids and AE folds: moderate interarytenoid edema\par Ventricle and false vocal folds: unremarkable\par True vocal folds: Smooth free edge; surface without ectasias or edema; normal movement bilaterally with good apposition in phonation\par Visible subglottis: unremarkable\par Other findings: ELM. No airway embarrassment

## 2020-02-11 NOTE — HISTORY OF PRESENT ILLNESS
[de-identified] : Presents c/o pain, globus & dysphagia for 5 days; this began after eating [place], but she didn't feel a bone. She was seen at [facility] twice in the intervening time period and was told that her "windpipe" and "epiglottis" were "swollen". Also her voice sounds raspy; no cough and denies frequent reflux. No other complaints. Was given prescribed 5d of 40mg/d prednisone but didn't take it. States that her imms are UTD. \par Also describes IBS & has a GI that follows her.

## 2020-02-13 ENCOUNTER — APPOINTMENT (OUTPATIENT)
Dept: OTOLARYNGOLOGY | Facility: CLINIC | Age: 30
End: 2020-02-13
Payer: MEDICAID

## 2020-02-13 VITALS
DIASTOLIC BLOOD PRESSURE: 77 MMHG | BODY MASS INDEX: 26.87 KG/M2 | OXYGEN SATURATION: 99 % | HEART RATE: 75 BPM | SYSTOLIC BLOOD PRESSURE: 122 MMHG | HEIGHT: 62 IN | WEIGHT: 146 LBS | TEMPERATURE: 97.9 F

## 2020-02-13 PROCEDURE — 99214 OFFICE O/P EST MOD 30 MIN: CPT | Mod: 25

## 2020-02-13 PROCEDURE — 31575 DIAGNOSTIC LARYNGOSCOPY: CPT

## 2020-02-13 NOTE — ASSESSMENT
[FreeTextEntry1] : Improving course; continue meds & RTC 1 wk. Reviewed danger signs that should prompt an ER visit

## 2020-02-13 NOTE — HISTORY OF PRESENT ILLNESS
[de-identified] : Presents c/o pain, globus & dysphagia that began a week ago; this began after eating [place], but she didn't feel a bone. Much improved throat discomfort and swallows ok; taking plenty of PO. No fevers or other complaints. Now taking the prescribed meds. States that her imms are UTD. \par Also describes IBS & has a GI that follows her.

## 2020-02-13 NOTE — PROCEDURE
[de-identified] : Indication: requirement for exam not possible via anterior examination;f/u epiglottic edema\par After verbal consent and the administration of an aerosolized phenylephrine/lidocaine mix, examination was performed with a flexible endoscope placed through the nose. Findings:\par Nasopharynx: unremarkable\par Soft palate, lateral and posterior pharyngeal walls: unremarkable\par Base of tongue & lingual tonsil: minimal retrodisplacement\par Vallecula: unremarkable\par Epiglottis: decreased edema; no FB or purulence seen\par Piriform sinuses and pharyngoesophageal junction: unremarkable\par Arytenoids and AE folds: moderate interarytenoid edema\par Ventricle and false vocal folds: unremarkable\par True vocal folds: Smooth free edge; surface without ectasias or edema; normal movement bilaterally with good apposition in phonation\par Visible subglottis: unremarkable\par Other findings: ELM. No airway embarrassment

## 2020-02-20 ENCOUNTER — APPOINTMENT (OUTPATIENT)
Dept: OTOLARYNGOLOGY | Facility: CLINIC | Age: 30
End: 2020-02-20
Payer: MEDICAID

## 2020-02-20 VITALS
BODY MASS INDEX: 26.13 KG/M2 | TEMPERATURE: 94.4 F | DIASTOLIC BLOOD PRESSURE: 79 MMHG | HEIGHT: 62 IN | SYSTOLIC BLOOD PRESSURE: 113 MMHG | OXYGEN SATURATION: 100 % | HEART RATE: 57 BPM | WEIGHT: 142 LBS

## 2020-02-20 DIAGNOSIS — Z87.09 PERSONAL HISTORY OF OTHER DISEASES OF THE RESPIRATORY SYSTEM: ICD-10-CM

## 2020-02-20 PROCEDURE — 31575 DIAGNOSTIC LARYNGOSCOPY: CPT

## 2020-02-20 PROCEDURE — 99214 OFFICE O/P EST MOD 30 MIN: CPT | Mod: 25

## 2020-02-20 NOTE — ASSESSMENT
[FreeTextEntry1] : Discussed her adenoid-area finding, which may be a Tornwaldt's cyst or neoplasm; RTC for biopsy

## 2020-02-20 NOTE — PROCEDURE
[de-identified] : Indication: requirement for exam not possible via anterior examination;f/u epiglottitis\par After verbal consent and the administration of an aerosolized phenylephrine/lidocaine mix, examination was performed with a flexible endoscope placed through the nose. Findings:\par Nasopharynx: midline cyst vs. pale mass\par Soft palate, lateral and posterior pharyngeal walls: unremarkable\par Base of tongue & lingual tonsil: minimal retrodisplacement\par Vallecula: unremarkable\par Epiglottis: normalized\par Piriform sinuses and pharyngoesophageal junction: unremarkable\par Arytenoids and AE folds: moderate interarytenoid edema\par Ventricle and false vocal folds: unremarkable\par True vocal folds: Smooth free edge; surface without ectasias or edema; normal movement bilaterally with good apposition in phonation\par Visible subglottis: unremarkable\par Other findings: ELM.

## 2020-02-20 NOTE — HISTORY OF PRESENT ILLNESS
[de-identified] : Presents c/o pain, globus & dysphagia that began a week ago; this began after eating [place], but she didn't feel a bone. Her discomfort has now almost completely resolved. No fevers or other complaints. Now taking the prescribed meds. States that her imms are UTD. \par Also describes IBS & has a GI that follows her.

## 2020-02-26 ENCOUNTER — LABORATORY RESULT (OUTPATIENT)
Age: 30
End: 2020-02-26

## 2020-02-26 ENCOUNTER — APPOINTMENT (OUTPATIENT)
Dept: OTOLARYNGOLOGY | Facility: CLINIC | Age: 30
End: 2020-02-26
Payer: MEDICAID

## 2020-02-26 VITALS
BODY MASS INDEX: 26.13 KG/M2 | SYSTOLIC BLOOD PRESSURE: 118 MMHG | OXYGEN SATURATION: 95 % | HEART RATE: 74 BPM | DIASTOLIC BLOOD PRESSURE: 63 MMHG | WEIGHT: 142 LBS | HEIGHT: 62 IN | TEMPERATURE: 97.8 F

## 2020-02-26 DIAGNOSIS — D49.0 NEOPLASM OF UNSPECIFIED BEHAVIOR OF DIGESTIVE SYSTEM: ICD-10-CM

## 2020-02-26 PROCEDURE — 31237 NSL/SINS NDSC SURG BX POLYPC: CPT

## 2020-02-26 NOTE — PROCEDURE
[FreeTextEntry1] : adenoid biopsy [FreeTextEntry2] : nasophaynx mass [FreeTextEntry3] : Fully discussed the R&B of a nasopharyngeal biopsy including but not limited to bleeding, infection, and discomfort, and the patient agreed and desired to proceed. The nose was sprayed with a lidocaine/oxymetazoline mixture, and then pledgets soaked with the same solution were placed in the nose for a period of time. After a time-out, the patient was placed supine,the packing was removed, and using a zero degree rigid endoscope attached to a video display system for monitoring, a thru-cut forcep was passed through the left nose a biopsy of the suspicious area was taken; it appeared cystic and a thick yellow substance was suctioned from it. The biopsy was sent for permanent pathologic analysis. The patient tolerated this well and was allowed to relax in the clinic chair for a period of time. Post-procedure care was discussed.

## 2020-03-12 ENCOUNTER — APPOINTMENT (OUTPATIENT)
Dept: OTOLARYNGOLOGY | Facility: CLINIC | Age: 30
End: 2020-03-12

## 2021-09-30 ENCOUNTER — APPOINTMENT (OUTPATIENT)
Dept: OTOLARYNGOLOGY | Facility: CLINIC | Age: 31
End: 2021-09-30
Payer: MEDICAID

## 2021-09-30 VITALS
BODY MASS INDEX: 26.13 KG/M2 | DIASTOLIC BLOOD PRESSURE: 65 MMHG | SYSTOLIC BLOOD PRESSURE: 105 MMHG | OXYGEN SATURATION: 96 % | TEMPERATURE: 97.7 F | HEIGHT: 62 IN | WEIGHT: 142 LBS | HEART RATE: 64 BPM

## 2021-09-30 DIAGNOSIS — J06.9 ACUTE UPPER RESPIRATORY INFECTION, UNSPECIFIED: ICD-10-CM

## 2021-09-30 PROCEDURE — 31575 DIAGNOSTIC LARYNGOSCOPY: CPT

## 2021-09-30 PROCEDURE — 99212 OFFICE O/P EST SF 10 MIN: CPT | Mod: 25

## 2021-09-30 NOTE — HISTORY OF PRESENT ILLNESS
[de-identified] : Presents c/o pain, globus & dysphagia that began two weeks ago; no fish bone concerns this time. Her discomfort has now almost completely resolved but some residual discomfort w/ talking or yawning. No fevers or other complaints. \par Also s/p marsupialization of a NP cyst 2/20; no related complaints.

## 2021-09-30 NOTE — PROCEDURE
[de-identified] : We discussed the elevated risk of liberation of viral particles from the nasopharynx if the patient were to be asymptomatically infected with COVID-19; after weighing the risks & benefits the decision was made to proceed. The procedure was performed while wearing appropriate PPE and a camera attached to a video system was used to maximize separation from the patient. The scope was handled appropriately. \par Indication: requirement for exam not possible via anterior examination; recurrent throat pain\par After verbal consent and NO administration of an aerosolized phenylephrine/lidocaine mix, examination was performed with a flexible endoscope placed through the nose. Findings:\par Nasopharynx: unremarkable\par Soft palate, lateral and posterior pharyngeal walls: unremarkable\par Base of tongue & lingual tonsil: minimal retrodisplacement\par Vallecula: unremarkable\par Epiglottis: unremarkable\par Piriform sinuses and pharyngoesophageal junction: unremarkable\par Arytenoids and AE folds: unremarkable\par Ventricle and false vocal folds: unremarkable\par True vocal folds: Smooth free edge; surface without ectasias or edema; normal movement bilaterally with good apposition in phonation\par Visible subglottis: unremarkable\par Other findings: none

## 2021-09-30 NOTE — PHYSICAL EXAM
[Laryngoscopy Performed] : laryngoscopy was performed, see procedure section for findings [Normal] : no rashes [de-identified] : large ITs bilat

## 2021-10-06 NOTE — ED PROVIDER NOTE - CPE EDP CARDIAC NORM
normal... Double O-Z Plasty Text: The defect edges were debeveled with a #15 scalpel blade.  Given the location of the defect, shape of the defect and the proximity to free margins a Double O-Z plasty (double transposition flap) was deemed most appropriate.  Using a sterile surgical marker, the appropriate transposition flaps were drawn incorporating the defect and placing the expected incisions within the relaxed skin tension lines where possible. The area thus outlined was incised deep to adipose tissue with a #15 scalpel blade.  The skin margins were undermined to an appropriate distance in all directions utilizing iris scissors.  Hemostasis was achieved with electrocautery.  The flaps were then transposed into place, one clockwise and the other counterclockwise, and anchored with interrupted buried subcutaneous sutures.

## 2022-12-05 NOTE — ED PROVIDER NOTE - INTERNATIONAL TRAVEL
Detail Level: Zone Note Text (......Xxx Chief Complaint.): This diagnosis correlates with the Render Risk Assessment In Note?: yes No

## 2023-01-01 NOTE — ED ADULT NURSE NOTE - NSIMPLEMENTINTERV_GEN_ALL_ED
Implemented All Universal Safety Interventions:  [place] to call system. Call bell, personal items and telephone within reach. Instruct patient to call for assistance. Room bathroom lighting operational. Non-slip footwear when patient is off stretcher. Physically safe environment: no spills, clutter or unnecessary equipment. Stretcher in lowest position, wheels locked, appropriate side rails in place. No - the patient is unable to be screened due to medical condition

## 2023-01-17 NOTE — ED ADULT NURSE NOTE - ILLNESS RECENT EXPOSURE
Patient: Anna Nettles Date: 2023   : 1986 Attending: KALIN Amaya   36 year old female      [facility]    PROCEDURE ASSESSMENT   (LOCAL ANESTHESIA - Not for use with Conscious Sedation)    Preop Diagnosis / Indications for Procedure: suspicious findings    Planned Procedure: Imaging guided biopsy    Planned Anesthetic:  local      MEDICAL HISTORY / COMORBID CONDITIONS (explain any No answers):  No Significant medical / surgical history: yes    Normal mental status:   yes      EXAMINATION PERTINENT TO PROCEDURE BEING PERFORMED  Evaluation of operative site yes      OTHER FINDINGS  Reviewed current medications and allergies yes      PERTINENT LAB / DIAGNOSTIC TESTS  NONE      INFORMED CONSENT  Consent obtained: yes    Risks / benefits, complications, and alternatives explained, questions answered and patient / personal representative agrees to:  procedure listed above and anesthetic listed above   None known

## 2024-08-06 NOTE — ED ADULT NURSE NOTE - MODE OF DISCHARGE
one-touch test strips is needed per pharmacy  Countour kit Meter RX is also needed per pharmacy    Medication: blood glucose test strip  passed protocol. Daniel Laboy DO   Last office visit date: 4.1.24 Violette Lucas DO  7.30.24 Daniel Laboy DO   Next appointment scheduled?: Yes   Number of refills given: 0    11/21/2024 7:40 AM Daniel Laboy DO     
-- DO NOT REPLY / DO NOT REPLY ALL --  -- This inbox is not monitored. If this was sent to the wrong provider or department, reroute message to P [facility] Reroute pool. --  -- Message is from Engagement Center Operations ([facility]) --      Message Type:  Refill Medication   Refill request for Pended medication named:   Preferred pharmacy verified, and selected.   [id number] - [place], [place] -  [address] AT [facility] [address]    Is the patient OUT of Medication?  Yes and Medication Refills handled by [facility] Clinical        Message: na                   
Ambulatory

## 2024-11-04 NOTE — ED PROVIDER NOTE - CHIEF COMPLAINT
Trokendi xr Pending    Insurance response  Prescription Drug Insurance: [place] Medicaid  Notes: Faxed prior authorization request to [phone] for signature. Please sign and fax to [facility] [phone]*, and they will complete the PA.     Please update encounter when faxed to pharmacy. Thank you!      The patient is a 26y Female complaining of hip pain/injury.

## 2025-02-01 NOTE — ED PROVIDER NOTE - RELIEVING FACTORS
Please take all of your prescribed medications, return to the emergency department for any thoughts 1 harm or self, anywhere else or any worsening hallucinations.  Follow-up with your psychiatrist.  
none

## 2025-02-05 NOTE — ED ADULT NURSE NOTE - CAS EDN DISCHARGE ASSESSMENT
Copied from CRM #[id number]. Topic: MW Medication/Rx - MW Rx Refill  >> Feb 5, 2025 12:53 PM Liza [place] wrote:  Neeta called to request a medication refill and is out of medications or critically low during working hours. Medication is:  Listed on Med Management list. [facility] to process refill: Selected 'Wrap Up CRM' and created new Refill Med Encounter after clicking 'Convert to Clinical Call'. Selected reason for call 'Refill Request'. Pended medication. Sent Med template and routed as high priority to [facility] MSG POOL. Readback full message.-- DO NOT REPLY / DO NOT REPLY ALL --  -- This inbox is not monitored. If this was sent to the wrong provider or department, reroute message to P [facility] Reroute pool. --  -- Message is from [facility] ([facility]) --      Message Type:  Refill Medication   Refill request for Pended medication named: oxyCODONE-acetaminophen (PERCOCET) 5-325 MG per tablet  Preferred pharmacy verified, and selected.   [id number] - [place] [address] [address]    Is the patient OUT of Medication?  Yes and During Work Hours Medication Refills handled by [facility] - route as high priority to [facility] MSG pool. Patient has been advised it will be addressed within 1 business day.     Message: Patient called pharmacy and stated they have not received medication yet. Please reach out.                   Alert and oriented to person, place and time